# Patient Record
Sex: MALE | Race: WHITE
[De-identification: names, ages, dates, MRNs, and addresses within clinical notes are randomized per-mention and may not be internally consistent; named-entity substitution may affect disease eponyms.]

---

## 2018-05-06 ENCOUNTER — HOSPITAL ENCOUNTER (EMERGENCY)
Dept: HOSPITAL 61 - ER | Age: 53
Discharge: HOME | End: 2018-05-06
Payer: SELF-PAY

## 2018-05-06 DIAGNOSIS — J20.9: Primary | ICD-10-CM

## 2018-05-06 PROCEDURE — 94640 AIRWAY INHALATION TREATMENT: CPT

## 2018-05-06 PROCEDURE — 96372 THER/PROPH/DIAG INJ SC/IM: CPT

## 2018-05-06 PROCEDURE — 71046 X-RAY EXAM CHEST 2 VIEWS: CPT

## 2018-05-06 PROCEDURE — 99284 EMERGENCY DEPT VISIT MOD MDM: CPT

## 2018-05-06 RX ADMIN — METHYLPREDNISOLONE SODIUM SUCCINATE 1 MG: 125 INJECTION, POWDER, FOR SOLUTION INTRAMUSCULAR; INTRAVENOUS at 20:11

## 2018-05-06 RX ADMIN — CEFTRIAXONE 1 GM: 1 INJECTION, POWDER, FOR SOLUTION INTRAMUSCULAR; INTRAVENOUS at 20:12

## 2018-05-06 RX ADMIN — IPRATROPIUM BROMIDE AND ALBUTEROL SULFATE 1 ML: .5; 3 SOLUTION RESPIRATORY (INHALATION) at 20:10

## 2019-02-28 ENCOUNTER — HOSPITAL ENCOUNTER (EMERGENCY)
Dept: HOSPITAL 61 - ER | Age: 54
Discharge: HOME | End: 2019-02-28
Payer: SELF-PAY

## 2019-02-28 VITALS — BODY MASS INDEX: 29.03 KG/M2 | WEIGHT: 185 LBS | HEIGHT: 67 IN

## 2019-02-28 VITALS — SYSTOLIC BLOOD PRESSURE: 132 MMHG | DIASTOLIC BLOOD PRESSURE: 79 MMHG

## 2019-02-28 DIAGNOSIS — J20.9: ICD-10-CM

## 2019-02-28 DIAGNOSIS — J10.1: Primary | ICD-10-CM

## 2019-02-28 LAB
INFLUENZA A PATIENT: POSITIVE
INFLUENZA B PATIENT: NEGATIVE

## 2019-02-28 PROCEDURE — 71046 X-RAY EXAM CHEST 2 VIEWS: CPT

## 2019-02-28 PROCEDURE — 99284 EMERGENCY DEPT VISIT MOD MDM: CPT

## 2019-02-28 PROCEDURE — 94640 AIRWAY INHALATION TREATMENT: CPT

## 2019-02-28 PROCEDURE — 87804 INFLUENZA ASSAY W/OPTIC: CPT

## 2019-02-28 NOTE — PHYS DOC
Past Medical History


Past Medical History:  No Pertinent History


Past Surgical History:  No Surgical History


Alcohol Use:  Occasionally


Drug Use:  None





Adult General


Chief Complaint


Chief Complaint:  Congestion





HPI


HPI





Patient is a 53-year-old male who presents with complaint of cough, fever, 

wheezing and body aches. Patient states symptoms have been present for the last 

3 days. He is not sure how high his fevers been but has been treating with 

Tylenol. He denies any chest pain or shortness of breath. He also denies any 

nausea, vomiting or diarrhea.





Review of Systems


Review of Systems





Constitutional: Positive fever and chills []


HENT: Positive congestion without sore throat []


Respiratory: Positive cough without shortness of breath []


Cardiovascular: No additional information not addressed in HPI []


GI: Denies abdominal pain, nausea, vomiting or diarrhea []





Current Medications


Current Medications





Current Medications








 Medications


  (Trade)  Dose


 Ordered  Sig/Heidi  Start Time


 Stop Time Status Last Admin


Dose Admin


 


 Albuterol/


 Ipratropium


  (Duoneb)  3 ml  1X  ONCE  2/28/19 03:30


 2/28/19 03:31 DC 2/28/19 03:20


3 ML











Allergies


Allergies





Allergies








Coded Allergies Type Severity Reaction Last Updated Verified


 


  No Known Drug Allergies    2/18/14 No











Physical Exam


Physical Exam





Constitutional: Well developed, well nourished, no acute distress, non-toxic 

appearance. []


HENT: Normocephalic, atraumatic, bilateral external ears normal, oropharynx 

moist, no oral exudates, nose normal. []


Eyes: PERRLA, EOMI, conjunctiva normal, no discharge. [] 


Neck: Normal range of motion, no tenderness, supple, no stridor. [] 


Cardiovascular:Heart rate regular rhythm, no murmur []


Lungs & Thorax:  Bilateral breath sounds clear to auscultation []


Abdomen: Bowel sounds normal, soft, no tenderness, no masses, no pulsatile 

masses. [] 


Skin: Warm, dry, no erythema, no rash. [] 


Back: No tenderness, no CVA tenderness. [] 


Extremities: No tenderness, no cyanosis, no clubbing, ROM intact, no edema. [] 


Neurologic: Alert and oriented X 3, normal motor function, normal sensory 

function, no focal deficits noted. []


Psychologic: Affect normal, judgement normal, mood normal. []





Current Patient Data


Vital Signs





 Vital Signs








  Date Time  Temp Pulse Resp B/P (MAP) Pulse Ox O2 Delivery O2 Flow Rate FiO2


 


2/28/19 03:20      Room Air  


 


2/28/19 03:00 99.0 97 20 132/79 (96) 97   





 99.0       











EKG


EKG


[]





Radiology/Procedures


Radiology/Procedures


[]


Impressions:


Two-view chest x-ray demonstrates no acute process.





Course & Med Decision Making


Course & Med Decision Making


Pertinent Labs and Imaging studies reviewed. (See chart for details)





[]





Dragon Disclaimer


Dragon Disclaimer


This electronic medical record was generated, in whole or in part, using a 

voice recognition dictation system.





Departure


Departure


Impression:  


 Primary Impression:  


 Acute bronchitis


 Additional Impression:  


 Influenza A


Disposition:  01 HOME, SELF-CARE


Condition:  STABLE


Referrals:  


NO PCP (PCP)


Patient Instructions:  Acute Bronchitis, Influenza, Adult


Scripts


Azithromycin (ZITHROMAX) 250 Mg Tablet


1 PKG PO UD, #6 TAB


   Prov: LEROY CHI Jr. DO         2/28/19





Problem Qualifiers








 Primary Impression:  


 Acute bronchitis


 Bronchitis organism:  unspecified organism  Qualified Codes:  J20.9 - Acute 

bronchitis, unspecified








LEROY CHI Jr. DO Feb 28, 2019 03:57

## 2019-02-28 NOTE — RAD
EXAM: PA and Lateral Views of the Chest

 

DATE: 2/28/2019 3:20 AM

 

INDICATION: short of air

 

COMPARISON: 5/6/2018 

 

FINDINGS:

The heart is not enlarged.

 

Mediastinal and hilar contours are normal.

 

No focal parenchymal airspace opacity.

 

No pleural effusion or pneumothorax.

 

Mild eventration of the right hemidiaphragm.

 

Degenerative changes of the spine are seen.

 

IMPRESSION:

No evidence for acute cardiopulmonary process

 

Electronically signed by: Raghav Riddle MD (2/28/2019 5:50 AM) [facility] [id number]